# Patient Record
Sex: MALE | Race: BLACK OR AFRICAN AMERICAN | Employment: FULL TIME | URBAN - NONMETROPOLITAN AREA
[De-identification: names, ages, dates, MRNs, and addresses within clinical notes are randomized per-mention and may not be internally consistent; named-entity substitution may affect disease eponyms.]

---

## 2017-01-30 ENCOUNTER — OFFICE VISIT (OUTPATIENT)
Dept: CARDIOLOGY | Age: 42
End: 2017-01-30

## 2017-01-30 VITALS
SYSTOLIC BLOOD PRESSURE: 110 MMHG | BODY MASS INDEX: 30.62 KG/M2 | DIASTOLIC BLOOD PRESSURE: 80 MMHG | WEIGHT: 231 LBS | HEART RATE: 88 BPM | HEIGHT: 73 IN

## 2017-01-30 DIAGNOSIS — R07.82 INTERCOSTAL PAIN: ICD-10-CM

## 2017-01-30 DIAGNOSIS — R94.31 ABNORMAL EKG: Primary | ICD-10-CM

## 2017-01-30 PROCEDURE — 99203 OFFICE O/P NEW LOW 30 MIN: CPT | Performed by: INTERNAL MEDICINE

## 2017-01-30 RX ORDER — LISINOPRIL AND HYDROCHLOROTHIAZIDE 12.5; 1 MG/1; MG/1
TABLET ORAL
Refills: 1 | COMMUNITY
Start: 2017-01-18

## 2017-01-30 RX ORDER — PRAVASTATIN SODIUM 20 MG
TABLET ORAL
Refills: 0 | COMMUNITY
Start: 2017-01-24 | End: 2017-11-15 | Stop reason: ALTCHOICE

## 2017-02-01 ENCOUNTER — TELEPHONE (OUTPATIENT)
Dept: CARDIOLOGY | Age: 42
End: 2017-02-01

## 2017-02-01 DIAGNOSIS — R07.9 CHEST PAIN AT REST: ICD-10-CM

## 2017-02-01 DIAGNOSIS — R07.82 INTERCOSTAL PAIN: Primary | ICD-10-CM

## 2017-09-11 ENCOUNTER — OFFICE VISIT (OUTPATIENT)
Dept: SURGERY | Age: 42
End: 2017-09-11
Payer: MEDICAID

## 2017-09-11 VITALS
HEIGHT: 76 IN | BODY MASS INDEX: 27.16 KG/M2 | TEMPERATURE: 97.6 F | HEART RATE: 60 BPM | SYSTOLIC BLOOD PRESSURE: 120 MMHG | RESPIRATION RATE: 16 BRPM | OXYGEN SATURATION: 98 % | DIASTOLIC BLOOD PRESSURE: 76 MMHG | WEIGHT: 223 LBS

## 2017-09-11 DIAGNOSIS — K64.2 THIRD DEGREE HEMORRHOIDS: ICD-10-CM

## 2017-09-11 DIAGNOSIS — K62.5 BRIGHT RED RECTAL BLEEDING: Primary | ICD-10-CM

## 2017-09-11 PROCEDURE — 99202 OFFICE O/P NEW SF 15 MIN: CPT | Performed by: SURGERY

## 2017-09-12 ASSESSMENT — ENCOUNTER SYMPTOMS
TROUBLE SWALLOWING: 0
VOICE CHANGE: 0
SINUS PRESSURE: 0
RHINORRHEA: 0
STRIDOR: 0
ABDOMINAL PAIN: 0
ABDOMINAL DISTENTION: 0
RECTAL PAIN: 1
EYE DISCHARGE: 0
NAUSEA: 0
WHEEZING: 0
COLOR CHANGE: 0
EYE ITCHING: 0
FACIAL SWELLING: 0
EYE REDNESS: 0
CONSTIPATION: 0
ANAL BLEEDING: 1
EYE PAIN: 0
PHOTOPHOBIA: 0
COUGH: 0
VOMITING: 0
CHEST TIGHTNESS: 0
SHORTNESS OF BREATH: 0
BLOOD IN STOOL: 0
CHOKING: 0
DIARRHEA: 0
SORE THROAT: 0
BACK PAIN: 0
APNEA: 0

## 2017-09-20 ENCOUNTER — TELEPHONE (OUTPATIENT)
Dept: SURGERY | Age: 42
End: 2017-09-20

## 2017-09-20 ENCOUNTER — PROCEDURE VISIT (OUTPATIENT)
Dept: SURGERY | Age: 42
End: 2017-09-20
Payer: MEDICAID

## 2017-09-20 VITALS
SYSTOLIC BLOOD PRESSURE: 110 MMHG | HEIGHT: 76 IN | DIASTOLIC BLOOD PRESSURE: 52 MMHG | WEIGHT: 223 LBS | HEART RATE: 59 BPM | RESPIRATION RATE: 18 BRPM | BODY MASS INDEX: 27.16 KG/M2 | TEMPERATURE: 96.7 F | OXYGEN SATURATION: 97 %

## 2017-09-20 DIAGNOSIS — K64.2 THIRD DEGREE HEMORRHOIDS: Primary | ICD-10-CM

## 2017-09-20 DIAGNOSIS — K64.3 FOURTH DEGREE HEMORRHOIDS: ICD-10-CM

## 2017-09-20 DIAGNOSIS — I10 ESSENTIAL HYPERTENSION: ICD-10-CM

## 2017-09-20 DIAGNOSIS — Z01.818 PRE-OP TESTING: ICD-10-CM

## 2017-09-20 PROCEDURE — 99214 OFFICE O/P EST MOD 30 MIN: CPT | Performed by: SURGERY

## 2017-10-12 LAB
ANION GAP SERPL CALCULATED.3IONS-SCNC: 11 MMOL/L
BUN BLDV-MCNC: 11 MG/DL (ref 10–20)
CALCIUM SERPL-MCNC: 9.6 MG/DL (ref 8.7–10.8)
CHLORIDE BLD-SCNC: 100 MMOL/L (ref 95–111)
CO2: 31 MMOL/L (ref 21–32)
CREAT SERPL-MCNC: 1.2 MG/DL (ref 0.5–1.3)
EGFR AFRICAN AMERICAN: 80
EGFR IF NONAFRICAN AMERICAN: 66
GLUCOSE: 97 MG/DL (ref 70–100)
POTASSIUM SERPL-SCNC: 4.4 MMOL/L (ref 3.5–5.4)
SODIUM BLD-SCNC: 138 MMOL/L (ref 134–147)

## 2017-11-14 NOTE — H&P
Mari Tyson MD [facility]  General Surgery  New Patient Evaluation in Office  Pt Name: Tad Bertrand  Date of Birth 1975   Today's Date: 9/11/2017  Medical Record Number: [id number]  Referring Provider: No ref. provider found  Primary Care Provider: Phoenix Garcia CNP  No chief complaint on file. ASSESSMENT      Problem List Items Addressed This Visit     Third degree hemorrhoids    Relevant Orders    [place] HEMORRHOIDECTOMY INTERNAL RUBBER BAND LIGATIONS      Other Visit Diagnoses     Bright red rectal bleeding    -  Primary    Relevant Orders    ANOSCOPY DIAGNOSTIC Performed in the office today to evaluate the etiology of his rectal bleeding and to see if he is a candidate for potential rubberbanding         Past Medical History:   Diagnosis Date    Hyperlipidemia     Hypertension           PLANS      1. Schedule Tino for3 quadrant hemorrhoidectomy  2. We attempted rubber banding in the office but the day he showed up his hemorrhoids were grade 4 that day with massive swelling  3. Gen. Anesthesia  4. Outpatient  5. [id number]  6. No prep required. 7.   Orders Placed This Encounter:  No orders of the defined types were placed in this encounter. Carri Brennan is a 43 y.o. male seen in the office for evaluation of rectal bleeding. He describes symptoms as rectal blood brbp with BM. He also has the feeling that something pops out and he has to push it back in. He states his been going on for about 10 years. Onset of symptoms was gradual 10 years ago ago and has been intermittent since that time. He describes symptoms as bleeding which only occurs with bowel movements. He denies family hx of colorectal CA, melena and crohn's disease. Treatment to date has been OTC creams: not effective long term. Pain is controlled without any medications. Pt has not had a previous colonoscopy. 10 years, bleeding, last time yesterday with wiping, hangs out, has to push back in.  We had scheduled problem, drooling, ear discharge, ear pain, facial swelling, hearing loss, mouth sores, nosebleeds, postnasal drip, rhinorrhea, sinus pressure, sneezing, sore throat, tinnitus, trouble swallowing and voice change. Eyes: Negative for photophobia, pain, discharge, redness, itching and visual disturbance. Respiratory: Negative for apnea, cough, choking, chest tightness, shortness of breath, wheezing and stridor. Cardiovascular: Negative for chest pain, palpitations and leg swelling. Gastrointestinal: Positive for anal bleeding and rectal pain. Negative for abdominal distention, abdominal pain, blood in stool, constipation, diarrhea, nausea and vomiting. Genitourinary: Negative for decreased urine volume, difficulty urinating, discharge, dysuria, enuresis, flank pain, frequency, genital sores, hematuria, penile pain, penile swelling, scrotal swelling, testicular pain and urgency. Musculoskeletal: Negative for arthralgias, back pain, gait problem, joint swelling, myalgias, neck pain and neck stiffness. Skin: Negative for color change, pallor, rash and wound. Neurological: Negative for dizziness, tremors, seizures, syncope, facial asymmetry, speech difficulty, weakness, light-headedness, numbness and headaches. Hematological: Negative for adenopathy. Does not bruise/bleed easily. Psychiatric/Behavioral: Negative for agitation, behavioral problems, confusion, decreased concentration, dysphoric mood, hallucinations, self-injury, sleep disturbance and suicidal ideas. The patient is not nervous/anxious and is not hyperactive. OBJECTIVE    VITALS:  vitals were not taken for this visit. CONSTITUTIONAL: Alert and oriented times 3, no acute distress and cooperative to examination with proper mood and affect. SKIN: Skin color, texture, turgor normal. No rashes or lesions.   Physical Examination: Eyes - pupils equal and reactive, extraocular eye movements intact  Ears - bilateral TM's and external ear

## 2017-11-16 ENCOUNTER — ANESTHESIA EVENT (OUTPATIENT)
Dept: OPERATING ROOM | Age: 42
End: 2017-11-16
Payer: MEDICAID

## 2017-11-16 ENCOUNTER — HOSPITAL ENCOUNTER (OUTPATIENT)
Age: 42
Setting detail: OUTPATIENT SURGERY
Discharge: HOME | End: 2017-11-16
Attending: SURGERY | Admitting: SURGERY
Payer: MEDICAID

## 2017-11-16 ENCOUNTER — ANESTHESIA (OUTPATIENT)
Dept: OPERATING ROOM | Age: 42
End: 2017-11-16
Payer: MEDICAID

## 2017-11-16 VITALS
DIASTOLIC BLOOD PRESSURE: 79 MMHG | HEART RATE: 58 BPM | TEMPERATURE: 97 F | RESPIRATION RATE: 16 BRPM | SYSTOLIC BLOOD PRESSURE: 128 MMHG | OXYGEN SATURATION: 96 % | WEIGHT: 222.6 LBS | HEIGHT: 73 IN | BODY MASS INDEX: 29.5 KG/M2

## 2017-11-16 VITALS
OXYGEN SATURATION: 99 % | DIASTOLIC BLOOD PRESSURE: 44 MMHG | SYSTOLIC BLOOD PRESSURE: 89 MMHG | RESPIRATION RATE: 55 BRPM

## 2017-11-16 LAB — POTASSIUM SERPL-SCNC: 4.1 MEQ/L (ref 3.5–5.2)

## 2017-11-16 PROCEDURE — 3700000000 HC ANESTHESIA ATTENDED CARE: Performed by: SURGERY

## 2017-11-16 PROCEDURE — 3600000013 HC SURGERY LEVEL 3 ADDTL 15MIN: Performed by: SURGERY

## 2017-11-16 PROCEDURE — 84132 ASSAY OF SERUM POTASSIUM: CPT

## 2017-11-16 PROCEDURE — 2580000003 HC RX 258: Performed by: SURGERY

## 2017-11-16 PROCEDURE — 6360000002 HC RX W HCPCS: Performed by: SURGERY

## 2017-11-16 PROCEDURE — 7100000000 HC PACU RECOVERY - FIRST 15 MIN: Performed by: SURGERY

## 2017-11-16 PROCEDURE — A6402 STERILE GAUZE <= 16 SQ IN: HCPCS | Performed by: SURGERY

## 2017-11-16 PROCEDURE — 2720000010 HC SURG SUPPLY STERILE: Performed by: SURGERY

## 2017-11-16 PROCEDURE — 6360000002 HC RX W HCPCS: Performed by: NURSE ANESTHETIST, CERTIFIED REGISTERED

## 2017-11-16 PROCEDURE — 46260 REMOVE IN/EX HEM GROUPS 2+: CPT | Performed by: SURGERY

## 2017-11-16 PROCEDURE — 2500000003 HC RX 250 WO HCPCS: Performed by: SURGERY

## 2017-11-16 PROCEDURE — 7100000011 HC PHASE II RECOVERY - ADDTL 15 MIN: Performed by: SURGERY

## 2017-11-16 PROCEDURE — 2500000003 HC RX 250 WO HCPCS: Performed by: NURSE ANESTHETIST, CERTIFIED REGISTERED

## 2017-11-16 PROCEDURE — 88304 TISSUE EXAM BY PATHOLOGIST: CPT

## 2017-11-16 PROCEDURE — 7100000001 HC PACU RECOVERY - ADDTL 15 MIN: Performed by: SURGERY

## 2017-11-16 PROCEDURE — 2580000003 HC RX 258: Performed by: NURSE ANESTHETIST, CERTIFIED REGISTERED

## 2017-11-16 PROCEDURE — 36415 COLL VENOUS BLD VENIPUNCTURE: CPT

## 2017-11-16 PROCEDURE — 3600000003 HC SURGERY LEVEL 3 BASE: Performed by: SURGERY

## 2017-11-16 PROCEDURE — 3700000001 HC ADD 15 MINUTES (ANESTHESIA): Performed by: SURGERY

## 2017-11-16 PROCEDURE — 7100000010 HC PHASE II RECOVERY - FIRST 15 MIN: Performed by: SURGERY

## 2017-11-16 RX ORDER — FENTANYL CITRATE 50 UG/ML
50 INJECTION, SOLUTION INTRAMUSCULAR; INTRAVENOUS EVERY 5 MIN PRN
Status: DISCONTINUED | OUTPATIENT
Start: 2017-11-16 | End: 2017-11-16 | Stop reason: HOSPADM

## 2017-11-16 RX ORDER — FENTANYL CITRATE 50 UG/ML
INJECTION, SOLUTION INTRAMUSCULAR; INTRAVENOUS PRN
Status: DISCONTINUED | OUTPATIENT
Start: 2017-11-16 | End: 2017-11-16 | Stop reason: SDUPTHER

## 2017-11-16 RX ORDER — LIDOCAINE HYDROCHLORIDE 20 MG/ML
INJECTION, SOLUTION INFILTRATION; PERINEURAL PRN
Status: DISCONTINUED | OUTPATIENT
Start: 2017-11-16 | End: 2017-11-16 | Stop reason: SDUPTHER

## 2017-11-16 RX ORDER — KETOROLAC TROMETHAMINE 30 MG/ML
30 INJECTION, SOLUTION INTRAMUSCULAR; INTRAVENOUS EVERY 6 HOURS
Status: DISCONTINUED | OUTPATIENT
Start: 2017-11-16 | End: 2017-11-16 | Stop reason: HOSPADM

## 2017-11-16 RX ORDER — MIDAZOLAM HYDROCHLORIDE 1 MG/ML
INJECTION INTRAMUSCULAR; INTRAVENOUS PRN
Status: DISCONTINUED | OUTPATIENT
Start: 2017-11-16 | End: 2017-11-16 | Stop reason: SDUPTHER

## 2017-11-16 RX ORDER — ONDANSETRON 2 MG/ML
4 INJECTION INTRAMUSCULAR; INTRAVENOUS EVERY 6 HOURS PRN
Status: DISCONTINUED | OUTPATIENT
Start: 2017-11-16 | End: 2017-11-16 | Stop reason: HOSPADM

## 2017-11-16 RX ORDER — SODIUM CHLORIDE 9 MG/ML
INJECTION, SOLUTION INTRAVENOUS CONTINUOUS PRN
Status: DISCONTINUED | OUTPATIENT
Start: 2017-11-16 | End: 2017-11-16 | Stop reason: SDUPTHER

## 2017-11-16 RX ORDER — BUPIVACAINE HYDROCHLORIDE AND EPINEPHRINE 5; 5 MG/ML; UG/ML
INJECTION, SOLUTION EPIDURAL; INTRACAUDAL; PERINEURAL PRN
Status: DISCONTINUED | OUTPATIENT
Start: 2017-11-16 | End: 2017-11-16 | Stop reason: HOSPADM

## 2017-11-16 RX ORDER — SODIUM CHLORIDE, SODIUM LACTATE, POTASSIUM CHLORIDE, CALCIUM CHLORIDE 600; 310; 30; 20 MG/100ML; MG/100ML; MG/100ML; MG/100ML
INJECTION, SOLUTION INTRAVENOUS CONTINUOUS
Status: DISCONTINUED | OUTPATIENT
Start: 2017-11-16 | End: 2017-11-16 | Stop reason: HOSPADM

## 2017-11-16 RX ORDER — SODIUM CHLORIDE 0.9 % (FLUSH) 0.9 %
10 SYRINGE (ML) INJECTION EVERY 12 HOURS SCHEDULED
Status: DISCONTINUED | OUTPATIENT
Start: 2017-11-16 | End: 2017-11-16 | Stop reason: HOSPADM

## 2017-11-16 RX ORDER — OXYCODONE HYDROCHLORIDE AND ACETAMINOPHEN 5; 325 MG/1; MG/1
1 TABLET ORAL EVERY 4 HOURS PRN
Status: DISCONTINUED | OUTPATIENT
Start: 2017-11-16 | End: 2017-11-16 | Stop reason: HOSPADM

## 2017-11-16 RX ORDER — SODIUM CHLORIDE 0.9 % (FLUSH) 0.9 %
10 SYRINGE (ML) INJECTION PRN
Status: DISCONTINUED | OUTPATIENT
Start: 2017-11-16 | End: 2017-11-16 | Stop reason: HOSPADM

## 2017-11-16 RX ORDER — ACETAMINOPHEN 325 MG/1
650 TABLET ORAL EVERY 4 HOURS PRN
Status: DISCONTINUED | OUTPATIENT
Start: 2017-11-16 | End: 2017-11-16 | Stop reason: HOSPADM

## 2017-11-16 RX ORDER — OXYCODONE HYDROCHLORIDE AND ACETAMINOPHEN 5; 325 MG/1; MG/1
1 TABLET ORAL EVERY 4 HOURS PRN
Qty: 42 TABLET | Refills: 0 | Status: SHIPPED | OUTPATIENT
Start: 2017-11-16 | End: 2017-11-29 | Stop reason: ALTCHOICE

## 2017-11-16 RX ORDER — ONDANSETRON 2 MG/ML
INJECTION INTRAMUSCULAR; INTRAVENOUS PRN
Status: DISCONTINUED | OUTPATIENT
Start: 2017-11-16 | End: 2017-11-16 | Stop reason: SDUPTHER

## 2017-11-16 RX ORDER — MEPERIDINE HYDROCHLORIDE 25 MG/ML
25 INJECTION INTRAMUSCULAR; INTRAVENOUS; SUBCUTANEOUS
Status: DISCONTINUED | OUTPATIENT
Start: 2017-11-16 | End: 2017-11-16 | Stop reason: HOSPADM

## 2017-11-16 RX ORDER — LABETALOL HYDROCHLORIDE 5 MG/ML
5 INJECTION, SOLUTION INTRAVENOUS EVERY 10 MIN PRN
Status: DISCONTINUED | OUTPATIENT
Start: 2017-11-16 | End: 2017-11-16 | Stop reason: HOSPADM

## 2017-11-16 RX ORDER — PROPOFOL 10 MG/ML
INJECTION, EMULSION INTRAVENOUS PRN
Status: DISCONTINUED | OUTPATIENT
Start: 2017-11-16 | End: 2017-11-16 | Stop reason: SDUPTHER

## 2017-11-16 RX ORDER — FENTANYL CITRATE 50 UG/ML
25 INJECTION, SOLUTION INTRAMUSCULAR; INTRAVENOUS EVERY 5 MIN PRN
Status: DISCONTINUED | OUTPATIENT
Start: 2017-11-16 | End: 2017-11-16 | Stop reason: HOSPADM

## 2017-11-16 RX ORDER — PROMETHAZINE HYDROCHLORIDE 25 MG/ML
12.5 INJECTION, SOLUTION INTRAMUSCULAR; INTRAVENOUS
Status: DISCONTINUED | OUTPATIENT
Start: 2017-11-16 | End: 2017-11-16 | Stop reason: HOSPADM

## 2017-11-16 RX ORDER — MORPHINE SULFATE 2 MG/ML
4 INJECTION, SOLUTION INTRAMUSCULAR; INTRAVENOUS
Status: DISCONTINUED | OUTPATIENT
Start: 2017-11-16 | End: 2017-11-16 | Stop reason: HOSPADM

## 2017-11-16 RX ORDER — DEXAMETHASONE SODIUM PHOSPHATE 4 MG/ML
INJECTION, SOLUTION INTRA-ARTICULAR; INTRALESIONAL; INTRAMUSCULAR; INTRAVENOUS; SOFT TISSUE PRN
Status: DISCONTINUED | OUTPATIENT
Start: 2017-11-16 | End: 2017-11-16 | Stop reason: SDUPTHER

## 2017-11-16 RX ORDER — DIPHENHYDRAMINE HYDROCHLORIDE 50 MG/ML
12.5 INJECTION INTRAMUSCULAR; INTRAVENOUS
Status: DISCONTINUED | OUTPATIENT
Start: 2017-11-16 | End: 2017-11-16 | Stop reason: HOSPADM

## 2017-11-16 RX ORDER — OXYCODONE HYDROCHLORIDE AND ACETAMINOPHEN 5; 325 MG/1; MG/1
2 TABLET ORAL EVERY 4 HOURS PRN
Status: DISCONTINUED | OUTPATIENT
Start: 2017-11-16 | End: 2017-11-16 | Stop reason: HOSPADM

## 2017-11-16 RX ORDER — MORPHINE SULFATE 2 MG/ML
2 INJECTION, SOLUTION INTRAMUSCULAR; INTRAVENOUS
Status: DISCONTINUED | OUTPATIENT
Start: 2017-11-16 | End: 2017-11-16 | Stop reason: HOSPADM

## 2017-11-16 RX ADMIN — PROPOFOL 30 MG: 10 INJECTION, EMULSION INTRAVENOUS at 07:29

## 2017-11-16 RX ADMIN — MIDAZOLAM HYDROCHLORIDE 2 MG: 1 INJECTION, SOLUTION INTRAMUSCULAR; INTRAVENOUS at 07:11

## 2017-11-16 RX ADMIN — PROPOFOL 170 MG: 10 INJECTION, EMULSION INTRAVENOUS at 07:17

## 2017-11-16 RX ADMIN — ONDANSETRON 4 MG: 2 INJECTION INTRAMUSCULAR; INTRAVENOUS at 07:22

## 2017-11-16 RX ADMIN — FENTANYL CITRATE 50 MCG: 50 INJECTION INTRAMUSCULAR; INTRAVENOUS at 07:30

## 2017-11-16 RX ADMIN — FENTANYL CITRATE 100 MCG: 50 INJECTION INTRAMUSCULAR; INTRAVENOUS at 07:13

## 2017-11-16 RX ADMIN — DEXAMETHASONE SODIUM PHOSPHATE 8 MG: 4 INJECTION, SOLUTION INTRAMUSCULAR; INTRAVENOUS at 07:22

## 2017-11-16 RX ADMIN — SODIUM CHLORIDE, POTASSIUM CHLORIDE, SODIUM LACTATE AND CALCIUM CHLORIDE: 600; 310; 30; 20 INJECTION, SOLUTION INTRAVENOUS at 06:15

## 2017-11-16 RX ADMIN — LIDOCAINE HYDROCHLORIDE 50 MG: 20 INJECTION, SOLUTION INFILTRATION; PERINEURAL at 07:17

## 2017-11-16 RX ADMIN — SODIUM CHLORIDE: 9 INJECTION, SOLUTION INTRAVENOUS at 07:14

## 2017-11-16 RX ADMIN — WATER 2 G: 1 INJECTION INTRAMUSCULAR; INTRAVENOUS; SUBCUTANEOUS at 07:17

## 2017-11-16 ASSESSMENT — PULMONARY FUNCTION TESTS
PIF_VALUE: 19
PIF_VALUE: 17
PIF_VALUE: 10
PIF_VALUE: 10
PIF_VALUE: 15
PIF_VALUE: 16
PIF_VALUE: 12
PIF_VALUE: 1
PIF_VALUE: 10
PIF_VALUE: 10
PIF_VALUE: 12
PIF_VALUE: 13
PIF_VALUE: 9
PIF_VALUE: 1
PIF_VALUE: 0
PIF_VALUE: 9
PIF_VALUE: 10
PIF_VALUE: 12
PIF_VALUE: 10
PIF_VALUE: 10
PIF_VALUE: 12
PIF_VALUE: 12
PIF_VALUE: 10
PIF_VALUE: 10
PIF_VALUE: 16
PIF_VALUE: 1
PIF_VALUE: 9
PIF_VALUE: 13
PIF_VALUE: 13
PIF_VALUE: 0
PIF_VALUE: 12
PIF_VALUE: 0
PIF_VALUE: 10
PIF_VALUE: 14
PIF_VALUE: 13
PIF_VALUE: 14
PIF_VALUE: 12
PIF_VALUE: 10
PIF_VALUE: 0
PIF_VALUE: 11
PIF_VALUE: 1
PIF_VALUE: 0
PIF_VALUE: 12
PIF_VALUE: 11
PIF_VALUE: 12

## 2017-11-16 ASSESSMENT — PAIN SCALES - GENERAL
PAINLEVEL_OUTOF10: 0

## 2017-11-16 ASSESSMENT — PAIN - FUNCTIONAL ASSESSMENT: PAIN_FUNCTIONAL_ASSESSMENT: 0-10

## 2017-11-16 NOTE — ANESTHESIA PRE PROCEDURE
Intravenous Q10 Min PRN Leanna Barrientos MD        meperidine (DEMEROL) injection 25 mg  25 mg Intravenous Once PRN Leanna Barrientos MD           Allergies:  No Known Allergies    Problem List:    Patient Active Problem List   Diagnosis Code    Intercostal pain R07.82    Third degree hemorrhoids K64.2       Past Medical History:        Diagnosis Date    Hyperlipidemia     Hypertension        Past Surgical History:  History reviewed. No pertinent surgical history. Social History:    Social History   Substance Use Topics    Smoking status: Never Smoker    Smokeless tobacco: Never Used    Alcohol use No                                Counseling given: Not Answered      Vital Signs (Current):   Vitals:    11/15/17 1346 11/16/17 0548   BP:  135/81   Pulse:  59   Resp:  18   Temp:  97.2 °F (36.2 °C)   TempSrc:  Temporal   SpO2:  95%   Weight: 215 lb (97.5 kg) 222 lb 9.6 oz (101 kg)   Height: 6' 1\" (1.854 m) 6' 1\" (1.854 m)                                              BP Readings from Last 3 Encounters:   11/16/17 135/81   09/20/17 (!) 110/52   09/11/17 120/76       NPO Status: Time of last liquid consumption: 2330                        Time of last solid consumption: 2330                        Date of last liquid consumption: 11/15/17                        Date of last solid food consumption: 11/15/17    BMI:   Wt Readings from Last 3 Encounters:   11/16/17 222 lb 9.6 oz (101 kg)   09/20/17 223 lb (101.2 kg)   09/11/17 223 lb (101.2 kg)     Body mass index is 29.37 kg/m². CBC: No results found for: WBC, RBC, HGB, HCT, MCV, RDW, PLT    CMP:   Lab Results   Component Value Date     10/11/2017    K 4.1 11/16/2017     10/11/2017    CO2 31 10/11/2017    BUN 11 10/11/2017    CREATININE 1.2 10/11/2017    GLUCOSE 97 10/11/2017    CALCIUM 9.6 10/11/2017       POC Tests: No results for input(s): POCGLU, POCNA, POCK, POCCL, POCBUN, POCHEMO, POCHCT in the last 72 hours.     Coags: No results found for: PROTIME, INR, APTT    HCG (If Applicable): No results found for: PREGTESTUR, PREGSERUM, HCG, HCGQUANT     ABGs: No results found for: PHART, PO2ART, FPJ9OPE, GKX2OUN, BEART, D1QIGZBB     Type & Screen (If Applicable):  No results found for: LABABO, [address]    Anesthesia Evaluation  Patient summary reviewed and Nursing notes reviewed  Airway: Mallampati: II  TM distance: >3 FB   Neck ROM: full  Mouth opening: > = 3 FB Dental: normal exam         Pulmonary:Negative Pulmonary ROS                              Cardiovascular:    (+) hypertension: mild,     (-) past MI, CAD, dysrhythmias and  angina       Beta Blocker:  Not on Beta Blocker         Neuro/Psych:   Negative Neuro/Psych ROS              GI/Hepatic/Renal: Neg GI/Hepatic/Renal ROS            Endo/Other: Negative Endo/Other ROS                    Abdominal:           Vascular:                                        Anesthesia Plan      general     ASA 2       Induction: intravenous. MIPS: Postoperative opioids intended and Prophylactic antiemetics administered. Anesthetic plan and risks discussed with patient and spouse. Plan discussed with CRNA. Jere Bleacher Linell Meckel, MD   11/16/2017

## 2017-11-16 NOTE — H&P
[facility]  History and Physical Update    Pt Name: Juliane Boateng  MRN: [id number]  YOB: 1975  Date of evaluation: 11/16/2017    [x] I have examined the patient and reviewed the H&P/Consult and there are no changes to the patient or plans.     [] I have examined the patient and reviewed the H&P/Consult and have noted the following changes:        Suzanne Dupont  Electronically signed 11/16/2017 at 6:43 AM

## 2017-11-17 ENCOUNTER — TELEPHONE (OUTPATIENT)
Dept: SURGERY | Age: 42
End: 2017-11-17

## 2017-11-25 PROBLEM — Z87.19 S/P HEMORRHOIDECTOMY: Status: ACTIVE | Noted: 2017-11-25

## 2017-11-25 PROBLEM — Z98.890 S/P HEMORRHOIDECTOMY: Status: ACTIVE | Noted: 2017-11-25

## 2017-11-25 NOTE — PROGRESS NOTES
Sarah Beth Rios MD [facility]  General Surgery  Postprocedure Evaluation in Office  Pt Name: Kalpana Sanders  Date of Birth 1975   Today's Date: 11/29/2017  Medical Record Number: [id number]  Referring Provider: No ref. provider found  Primary Care Provider: Gregory Breaux CNP  Chief Complaint   Patient presents with    Post-Op Check     s/p Three-quadrant hemorrhoidectomy 11/16/17     ASSESSMENT      Problem List Items Addressed This Visit     S/P hemorrhoidectomy      Other Visit Diagnoses    None. PLAN       Pathology reviewed with the patient who understands. All questions were answered. Patient Instructions   OK to start lifting      Follow up: Return in about 4 weeks (around 12/27/2017). Orders Placed This Encounter:  No orders of the defined types were placed in this encounter. Angela Wei is seen today for post-op follow-up. He is 2 week(s) status post 3 quadrant hemorrhoidectomy. He is tolerating a regular diet, having regular bowel movements. Symptoms and activity have gradually improved compared to preoperative. The surgical site is clean and has no drainage. The patient is not having any pain. Chales Minus He has compliant with postoperative instructions. Past Medical History  Past Medical History:   Diagnosis Date    Hyperlipidemia     Hypertension      Past Surgical History  Past Surgical History:   Procedure Laterality Date    MO HEMORRHOIDECTOMY,INT/EXT,1 COLUMN/GROUP N/A 11/16/2017    3 QUANDRANT HEMORRHOIDECTOMY performed by Sarah Beth Rios MD at [facility]     Medications  Current Outpatient Prescriptions on File Prior to Visit   Medication Sig Dispense Refill    lisinopril-hydrochlorothiazide (PRINZIDE;ZESTORETIC) 10-12.5 MG per tablet take 1 tablet by mouth once daily  1    hydrocortisone (ANUSOL-HC) 2.5 % rectal cream Place rectally 2 times daily Place rectally 2 times daily. No current facility-administered medications on file prior to visit.       Allergies  No

## 2017-11-29 ENCOUNTER — OFFICE VISIT (OUTPATIENT)
Dept: SURGERY | Age: 42
End: 2017-11-29

## 2017-11-29 VITALS
RESPIRATION RATE: 18 BRPM | HEART RATE: 68 BPM | WEIGHT: 219 LBS | TEMPERATURE: 97.3 F | SYSTOLIC BLOOD PRESSURE: 132 MMHG | DIASTOLIC BLOOD PRESSURE: 88 MMHG | BODY MASS INDEX: 28.89 KG/M2

## 2017-11-29 DIAGNOSIS — Z98.890 S/P HEMORRHOIDECTOMY: ICD-10-CM

## 2017-11-29 DIAGNOSIS — Z87.19 S/P HEMORRHOIDECTOMY: ICD-10-CM

## 2017-11-29 PROCEDURE — 99024 POSTOP FOLLOW-UP VISIT: CPT | Performed by: SURGERY

## 2019-01-01 ENCOUNTER — HOSPITAL ENCOUNTER (EMERGENCY)
Age: 44
Discharge: HOME | End: 2019-01-01
Payer: MEDICAID

## 2019-01-01 VITALS
SYSTOLIC BLOOD PRESSURE: 139 MMHG | WEIGHT: 222 LBS | OXYGEN SATURATION: 98 % | HEART RATE: 90 BPM | DIASTOLIC BLOOD PRESSURE: 73 MMHG | RESPIRATION RATE: 16 BRPM | BODY MASS INDEX: 28.49 KG/M2 | HEIGHT: 74 IN | TEMPERATURE: 97.8 F

## 2019-01-01 DIAGNOSIS — M54.31 SCIATICA OF RIGHT SIDE: Primary | ICD-10-CM

## 2019-01-01 PROCEDURE — 99213 OFFICE O/P EST LOW 20 MIN: CPT | Performed by: NURSE PRACTITIONER

## 2019-01-01 PROCEDURE — 99213 OFFICE O/P EST LOW 20 MIN: CPT

## 2019-01-01 RX ORDER — CYCLOBENZAPRINE HCL 10 MG
10 TABLET ORAL 3 TIMES DAILY PRN
Qty: 20 TABLET | Refills: 0 | Status: SHIPPED | OUTPATIENT
Start: 2019-01-01 | End: 2019-01-08

## 2019-01-01 RX ORDER — PREDNISONE 20 MG/1
40 TABLET ORAL DAILY
Qty: 10 TABLET | Refills: 0 | Status: SHIPPED | OUTPATIENT
Start: 2019-01-01 | End: 2019-01-06

## 2019-01-01 ASSESSMENT — ENCOUNTER SYMPTOMS
VOMITING: 0
NAUSEA: 0

## 2019-01-01 ASSESSMENT — PAIN DESCRIPTION - LOCATION: LOCATION: BACK

## 2019-01-01 ASSESSMENT — PAIN DESCRIPTION - PAIN TYPE: TYPE: ACUTE PAIN

## 2019-01-01 ASSESSMENT — PAIN SCALES - GENERAL: PAINLEVEL_OUTOF10: 7

## 2019-01-01 ASSESSMENT — PAIN DESCRIPTION - ORIENTATION: ORIENTATION: LOWER;MID

## 2020-11-26 ENCOUNTER — HOSPITAL ENCOUNTER (EMERGENCY)
Age: 45
Discharge: HOME | End: 2020-11-26
Payer: MEDICAID

## 2020-11-26 VITALS
RESPIRATION RATE: 16 BRPM | BODY MASS INDEX: 30.07 KG/M2 | TEMPERATURE: 98.2 F | DIASTOLIC BLOOD PRESSURE: 109 MMHG | WEIGHT: 222 LBS | HEIGHT: 72 IN | SYSTOLIC BLOOD PRESSURE: 173 MMHG | HEART RATE: 72 BPM | OXYGEN SATURATION: 99 %

## 2020-11-26 PROCEDURE — 99282 EMERGENCY DEPT VISIT SF MDM: CPT

## 2020-11-26 RX ORDER — TRIAMCINOLONE ACETONIDE 5 MG/G
OINTMENT TOPICAL
Qty: 1 TUBE | Refills: 0 | Status: SHIPPED | OUTPATIENT
Start: 2020-11-26 | End: 2020-12-03

## 2020-11-26 RX ORDER — CEPHALEXIN 500 MG/1
500 CAPSULE ORAL 4 TIMES DAILY
Qty: 28 CAPSULE | Refills: 0 | Status: SHIPPED | OUTPATIENT
Start: 2020-11-26 | End: 2020-12-03

## 2020-11-26 ASSESSMENT — ENCOUNTER SYMPTOMS
NAUSEA: 0
RHINORRHEA: 0
ABDOMINAL PAIN: 0
COUGH: 0
BACK PAIN: 0
VOMITING: 0
WHEEZING: 0
SHORTNESS OF BREATH: 0

## 2020-11-26 ASSESSMENT — PAIN DESCRIPTION - PAIN TYPE: TYPE: ACUTE PAIN

## 2020-11-26 ASSESSMENT — PAIN DESCRIPTION - ORIENTATION: ORIENTATION: LEFT

## 2020-11-26 ASSESSMENT — PAIN DESCRIPTION - LOCATION: LOCATION: FINGER (COMMENT WHICH ONE)

## 2020-11-26 NOTE — ED NOTES
Bed: 021A  Expected date:   Expected time:   Means of arrival:   Comments:  101 Kojo Lopez RN  11/26/20 9743

## 2020-11-26 NOTE — ED NOTES
Pt arrives to ED with left hand pinky pain and swelling. Pt states it began 3 days ago. Pt states he \"stabbed it to attempt to open it\" pt denies any injury. Pt also states he has not been taking his blood pressure medication. Pt shows no signs of distress.       Rhonda REINA RN  11/26/20 5880

## 2020-11-26 NOTE — ED PROVIDER NOTES
[facility] EMERGENCY DEPT      CHIEF COMPLAINT       Chief Complaint   Patient presents with    Finger Pain       Nurses Notes reviewed and I agree except as noted in the HPI. HISTORY OF PRESENT ILLNESS    Cassie Coffman is a 39 y.o. male who presents for left 5th finger pain x 2-3 days which has progressive become more painful and swollen. He reports soaking it in warm water with no help. He has not taken anything for the pain. He states he has hypertension and he has not taken his BP meds \"in a while\". He has a history of MRSA in the axilla a couple years ago and a history of eczema which he states is not under well control. He denies fevers, chills, headache, chest pain, shortness of breath. The patient is right-hand dominant. Patient denies injury. REVIEW OF SYSTEMS     Review of Systems   Constitutional: Negative for chills and fever. HENT: Negative for rhinorrhea. Eyes: Negative for visual disturbance. Respiratory: Negative for cough, shortness of breath and wheezing. Cardiovascular: Negative for chest pain. Gastrointestinal: Negative for abdominal pain, nausea and vomiting. Musculoskeletal: Positive for arthralgias and myalgias. Negative for back pain and neck pain. Skin: Negative for rash (eczema on extensor chi) and wound. Neurological: Negative for weakness and numbness. Psychiatric/Behavioral: Negative for confusion. PAST MEDICAL HISTORY    has a past medical history of Hyperlipidemia and Hypertension. SURGICAL HISTORY      has a past surgical history that includes pr hemorrhoidectomy,int/ext,1 column/group (N/A, 11/16/2017). CURRENT MEDICATIONS       Discharge Medication List as of 11/26/2020  3:52 AM      CONTINUE these medications which have NOT CHANGED    Details   hydrocortisone (ANUSOL-HC) 2.5 % rectal cream Place rectally 2 times daily Place rectally 2 times daily. , Rectal, 2 TIMES DAILY, Until Discontinued, Historical Med lisinopril-hydrochlorothiazide (PRINZIDE;ZESTORETIC) 10-12.5 MG per tablet take 1 tablet by mouth once daily, R-1             ALLERGIES     has No Known Allergies. FAMILY HISTORY     He indicated that his mother is . He indicated that his father is . family history includes Heart Disease in his mother. SOCIAL HISTORY    reports that he has never smoked. He has never used smokeless tobacco. He reports that he does not drink alcohol or use drugs. PHYSICAL EXAM     INITIAL VITALS:  height is 6' (1.829 m) and weight is 222 lb (100.7 kg). His oral temperature is 98.2 °F (36.8 °C). His blood pressure is 173/109 (abnormal) and his pulse is 72. His respiration is 16 and oxygen saturation is 99%. Physical Exam  Vitals signs and nursing note reviewed. Constitutional:       General: He is not in acute distress. Appearance: Normal appearance. He is well-developed. He is not toxic-appearing or diaphoretic. HENT:      Head: Normocephalic and atraumatic. Right Ear: Hearing normal.      Left Ear: Hearing normal.      Nose: Nose normal.   Eyes:      General: Lids are normal.      Conjunctiva/sclera: Conjunctivae normal.   Neck:      Musculoskeletal: No neck rigidity. Trachea: No tracheal deviation. Cardiovascular:      Rate and Rhythm: Normal rate and regular rhythm. Pulses:           Radial pulses are 2+ on the right side and 2+ on the left side. Pulmonary:      Effort: Pulmonary effort is normal. No tachypnea or respiratory distress. Breath sounds: No stridor. Abdominal:      General: There is no distension. Palpations: Abdomen is soft. Musculoskeletal: Normal range of motion. General: Swelling (to 5th left digit over the PIP joint with edema, wamrth, and erythema) and tenderness present. Comments: ROM of PIP and DIP limited prior to I & D. Skin:     General: Skin is warm and dry.       Capillary Refill: Capillary refill takes less than 2 seconds. Coloration: Skin is not pale. Findings: No abrasion, ecchymosis or rash. Neurological:      Mental Status: He is alert and oriented to person, place, and time. GCS: GCS eye subscore is 4. GCS verbal subscore is 5. GCS motor subscore is 6. Sensory: No sensory deficit. Gait: Gait normal.   Psychiatric:         Speech: Speech normal.         Behavior: Behavior normal.         Thought Content: Thought content normal.         DIFFERENTIAL DIAGNOSIS:   Including but not limited to: hematoma, abscess, cellulitis, injury, unlikely septic joint or tenosynovitis     DIAGNOSTIC RESULTS     EKG: All E[facility]'s are [facility] who either signs or Co-signs this chart in the absence of a cardiologist.  None    RADIOLOGY: non-plain film images(s) such as CT,Ultrasound and MRI are read by the radiologist.  Plain radiographic images are visualized and preliminarily interpreted by the emergency physician unless otherwise stated below. No orders to display       LABS:   Labs Reviewed - No data to display    EMERGENCY DEPARTMENT COURSE:   Vitals:    Vitals:    11/26/20 0227 11/26/20 0344   BP: (!) 167/124 (!) 173/109   Pulse: 72    Resp: 16    Temp: 98.2 °F (36.8 °C)    TempSrc: Oral    SpO2: 99%    Weight: 222 lb (100.7 kg)    Height: 6' (1.829 m)        MDM:  Mr. John Hills presented to the ED with left 5th digit pain and swelling x 2-3 days. Vitals assessed and showed hypertension, patient hasn't taken at home anti-hypertensive medication \"in awhile\". On PE, there is a fluctuant swelling over the PIP joint of the left 5th digit. No fever or streaking redness. An incision and drainage of the fluctuant area was performed and blood drained from what appeared to be a hematoma. After procedure, his ROM of PIP and DIP joints was much improved. A splint was applied and he is stable for discharge. He was educated to follow up if his symptoms worsen or do not improve. Education in regards to medication compliance and adverse effects of untreated hypertension provided. The patient remained neurovascularly intact. I have given the patient strict written and verbal instructions about care at home, follow-up, and signs and symptoms of worsening of condition and they did verbalize understanding. CRITICAL CARE:   None    CONSULTS:  None    PROCEDURES:  Incision/Drainage    Date/Time: 11/26/2020 3:15 AM  Performed by: Edison Cardenas PA-C  Authorized by: Edison Cardenas PA-C     Consent:     Consent obtained:  Verbal    Consent given by:  Patient    Risks discussed:  Bleeding, incomplete drainage, pain and infection    Alternatives discussed:  No treatment and alternative treatment  Location:     Type:  Hematoma    Size:  4 cm x 2 cm x 1 cm    Location:  Upper extremity    Upper extremity location:  Finger    Finger location:  L small finger  Pre-procedure details:     Skin preparation:  Betadine  Anesthesia (see MAR for exact dosages): Anesthesia method:  Local infiltration    Local anesthetic:  Lidocaine 1% w/o epi  Procedure type:     Complexity:  Simple  Procedure details:     Needle aspiration: no      Incision types:  Single straight    Incision depth:  Subcutaneous    Scalpel blade:  11    Wound management:  Probed and deloculated    Drainage:  Bloody    Drainage amount: Moderate    Wound treatment:  Wound left open    Packing materials:  None  Post-procedure details:     Patient tolerance of procedure: Tolerated well, no immediate complications  Comments:      Incision and drainage performed by Saige CARPENTER under my direct supervision. FINAL IMPRESSION      1. Hemarthrosis, nontraumatic left 5th digit PIP joint    2. Eczema, unspecified type    3. Uncontrolled hypertension          DISPOSITION/PLAN     1. Hemarthrosis, nontraumatic left 5th digit PIP joint    2. Eczema, unspecified type    3.  Uncontrolled hypertension        PATIENT REFERRED TO:  Handy Meredith DONNA GarciaN - CNP  [address] [place] [address]    Schedule an appointment as soon as possible for a visit         DISCHARGE MEDICATIONS:  Discharge Medication List as of 11/26/2020  3:52 AM      START taking these medications    Details   cephALEXin (KEFLEX) 500 MG capsule Take 1 capsule by mouth 4 times daily for 7 days, Disp-28 capsule,R-0Print      triamcinolone (ARISTOCORT) 0.5 % ointment Apply topically 2 times daily. , Disp-1 Tube,R-0, Print             (Please note that portions of this note were completed with a voice recognition program.  Efforts were made to edit the dictations but occasionally words are mis-transcribed.)    William Newell PA-C 11/30/20 7:25 AM    BHARATI Lugo PA-C  11/30/20 [id number]

## 2021-03-23 ENCOUNTER — APPOINTMENT (OUTPATIENT)
Dept: GENERAL RADIOLOGY | Age: 46
End: 2021-03-23
Payer: MEDICAID

## 2021-03-23 ENCOUNTER — HOSPITAL ENCOUNTER (EMERGENCY)
Age: 46
Discharge: HOME | End: 2021-03-23
Payer: MEDICAID

## 2021-03-23 VITALS
SYSTOLIC BLOOD PRESSURE: 140 MMHG | WEIGHT: 215 LBS | RESPIRATION RATE: 18 BRPM | HEIGHT: 73 IN | TEMPERATURE: 97.8 F | BODY MASS INDEX: 28.49 KG/M2 | DIASTOLIC BLOOD PRESSURE: 101 MMHG | HEART RATE: 65 BPM | OXYGEN SATURATION: 98 %

## 2021-03-23 DIAGNOSIS — M79.672 PAIN OF LEFT HEEL: Primary | ICD-10-CM

## 2021-03-23 PROCEDURE — 73630 X-RAY EXAM OF FOOT: CPT

## 2021-03-23 PROCEDURE — 6370000000 HC RX 637 (ALT 250 FOR IP): Performed by: NURSE PRACTITIONER

## 2021-03-23 PROCEDURE — 99285 EMERGENCY DEPT VISIT HI MDM: CPT | Performed by: NURSE PRACTITIONER

## 2021-03-23 RX ORDER — CYCLOBENZAPRINE HCL 10 MG
10 TABLET ORAL ONCE
Status: COMPLETED | OUTPATIENT
Start: 2021-03-23 | End: 2021-03-23

## 2021-03-23 RX ORDER — TRAMADOL HYDROCHLORIDE 50 MG/1
50 TABLET ORAL ONCE
Status: COMPLETED | OUTPATIENT
Start: 2021-03-23 | End: 2021-03-23

## 2021-03-23 RX ORDER — KETOROLAC TROMETHAMINE 10 MG/1
10 TABLET, FILM COATED ORAL EVERY 6 HOURS PRN
Qty: 20 TABLET | Refills: 0 | Status: SHIPPED | OUTPATIENT
Start: 2021-03-23

## 2021-03-23 RX ADMIN — CYCLOBENZAPRINE HYDROCHLORIDE 10 MG: 10 TABLET, FILM COATED ORAL at 10:07

## 2021-03-23 RX ADMIN — TRAMADOL HYDROCHLORIDE 50 MG: 50 TABLET, FILM COATED ORAL at 10:07

## 2021-03-23 ASSESSMENT — ENCOUNTER SYMPTOMS
COUGH: 0
SHORTNESS OF BREATH: 0

## 2021-03-23 ASSESSMENT — PAIN DESCRIPTION - LOCATION: LOCATION: FOOT

## 2021-03-23 ASSESSMENT — PAIN SCALES - GENERAL: PAINLEVEL_OUTOF10: 4

## 2021-03-23 ASSESSMENT — PAIN DESCRIPTION - ORIENTATION: ORIENTATION: LEFT

## 2021-03-23 NOTE — ED NOTES
Patient transported to Radiology department via [employer] tech in stable condition.        Ana Smith RN  03/23/21 9144

## 2021-03-23 NOTE — ED TRIAGE NOTES
Pt presents to ER with left ankle/foot pain that started yesterday. Pain was severe yesterday but has improved today. Pain is rated 4 out of 10. No obvious deformity or swelling noted.

## 2021-03-23 NOTE — ED PROVIDER NOTES
[facility] COMPLAINT       Chief Complaint   Patient presents with    Foot Pain       Nurses Notes reviewed and I agree except as noted in the HPI. HISTORY OF PRESENT ILLNESS    Bryson Rendon is a 39 y.o. male who presents to the Emergency Department for the evaluation of left foot pain. Patient states he noticed left heel discomfort yesterday, but he woke up today with pain 10/10 severity and was unable to bear weight. Describes the pain as a \"stiffness\". No radiation. Reports trying ice and heat with no relief, but has not tried pain medication. Denies fall or trauma or previous bone or connective tissue injury. Patient has never experienced anything like this before. The HPI was provided by the patient. REVIEW OF SYSTEMS     Review of Systems   Constitutional: Negative for chills, diaphoresis, fatigue and fever. Respiratory: Negative for cough and shortness of breath. Cardiovascular: Negative for chest pain and leg swelling. Musculoskeletal: Positive for arthralgias and gait problem. Neurological: Negative for tremors, weakness and numbness. Psychiatric/Behavioral: Negative for agitation, confusion and self-injury. The patient is not nervous/anxious. PAST MEDICAL HISTORY    has a past medical history of Hyperlipidemia and Hypertension. SURGICAL HISTORY      has a past surgical history that includes pr hemorrhoidectomy,int/ext,1 column/group (N/A, 11/16/2017). CURRENT MEDICATIONS       Discharge Medication List as of 3/23/2021 10:55 AM      CONTINUE these medications which have NOT CHANGED    Details   hydrocortisone (ANUSOL-HC) 2.5 % rectal cream Place rectally 2 times daily Place rectally 2 times daily. , Rectal, 2 TIMES DAILY, Until Discontinued, Historical Med      lisinopril-hydrochlorothiazide (PRINZIDE;ZESTORETIC) 10-12.5 MG per tablet take 1 tablet by mouth once daily, R-1             ALLERGIES     has fracture    DIAGNOSTIC RESULTS     EKG: All EKG's are interpreted by the Emergency Department Physician who either signs or Co-signs this chart in the absence of a cardiologist.    None    RADIOLOGY: non-plainfilm images(s) such as CT, Ultrasound and MRI are read by the radiologist.    XR FOOT LEFT (MIN 3 VIEWS)   Final Result   No acute abnormality            **This report has been created using voice recognition software. It may contain minor errors which are inherent in voice recognition technology. **      Final report electronically signed by Dr. Jessi Quinn on 3/23/2021 9:52 AM          LABS:     Labs Reviewed - No data to display    EMERGENCY DEPARTMENT COURSE:   Vitals:    Vitals:    03/23/21 0859   BP: (!) 140/101   Pulse: 65   Resp: 18   Temp: 97.8 °F (36.6 °C)   TempSrc: Oral   SpO2: 98%   Weight: 215 lb (97.5 kg)   Height: 6' 1\" (1.854 m)       8:56 AM EDT: The patient was seen and evaluated. MDM:  Plain films completed, no acute osseous abnormality, patient instructed to follow with podiatry. Provided with appropriate analgesia as well as work note. No other questions or concerns were voiced and he was discharged in stable condition. CRITICAL CARE:   None    CONSULTS:  None    PROCEDURES:  None    FINAL IMPRESSION      1. Pain of left heel          DISPOSITION/PLAN   Discharge    PATIENT REFERRED TO:  Shadi Goff  [address] [place] [address]  [phone]    Schedule an appointment as soon as possible for a visit         DISCHARGE MEDICATIONS:  Discharge Medication List as of 3/23/2021 10:55 AM      START taking these medications    Details   ketorolac (TORADOL) 10 MG tablet Take 1 tablet by mouth every 6 hours as needed for Pain, Disp-20 tablet, R-0Print             (Please note that portions of this note were completed with a voice recognition program.  Efforts were made to edit the dictations but occasionally words are mis-transcribed.)    The patient was given an opportunity to see the Emergency Attending. The patient voiced understanding that I was a Mid-LevelProvider and was in agreement with being seen independently by myself. Provider:  I personally performed the services described in the documentation, reviewed and edited the documentation which was dictated to the scribe in my presence, and it accurately records my words and actions.     KASSI Muse CNP, 3/23/21, 3:49 PM       KASSI Muse CNP  03/24/21 154

## 2021-12-26 ENCOUNTER — HOSPITAL ENCOUNTER (EMERGENCY)
Age: 46
Discharge: HOME | End: 2021-12-26
Attending: STUDENT IN AN ORGANIZED HEALTH CARE EDUCATION/TRAINING PROGRAM
Payer: COMMERCIAL

## 2021-12-26 VITALS
RESPIRATION RATE: 16 BRPM | DIASTOLIC BLOOD PRESSURE: 107 MMHG | HEART RATE: 88 BPM | TEMPERATURE: 98.8 F | SYSTOLIC BLOOD PRESSURE: 144 MMHG | OXYGEN SATURATION: 97 %

## 2021-12-26 DIAGNOSIS — J11.1 INFLUENZA WITH RESPIRATORY MANIFESTATION OTHER THAN PNEUMONIA: Primary | ICD-10-CM

## 2021-12-26 LAB
FLU A ANTIGEN: POSITIVE
FLU B ANTIGEN: NEGATIVE
SARS-COV-2, NAAT: NOT DETECTED

## 2021-12-26 PROCEDURE — 87635 SARS-COV-2 COVID-19 AMP PRB: CPT

## 2021-12-26 PROCEDURE — 87804 INFLUENZA ASSAY W/OPTIC: CPT

## 2021-12-26 PROCEDURE — 99281 EMR DPT VST MAYX REQ PHY/QHP: CPT

## 2021-12-26 NOTE — ED PROVIDER NOTES
[address] MEDICINE ATTENDING ATTESTATION      Evaluation of Adry Winchester. Case discussed and care plan developed with resident physician. I agree with the resident physician documentation and plan as documented by her, except if my documentation differs. Patient seen, interviewed and examined by me. I reviewed the medical, surgical, family and social history, medications and allergies. I have reviewed the nursing documentation. I have reviewed the patient's vital signs and are normal per my interpretation. There is no height or weight on file to calculate BMI. Pulsoxymetry is normal per my interpretation. Brief H&P   Patient c/o nasal congestion/rhinorrhea x3 days, requesting COVID test    Physical exam is notable for well appearing, lungs clear bilaterally, nasal congestion       Medical Decision Making   MDM:   Patient is a 60-year-old male with no significant past medical history who presents with 3 days of congestion and rhinorrhea. Patient is positive for influenza A. Patient is outside Tamiflu treatment window and is overall well-appearing with minimal symptoms. Plan:    Supportive care   Discharge   Return precautions    Please see the resident physician completed note for final disposition except as documented on this attestation. I have reviewed and interpreted all available lab, radiology and ekg results available at the moment. Diagnosis, treatment and disposition plans were discussed and agreed upon by patient. This transcription was electronically signed. It was dictated by use of voice recognition software and electronically transcribed. The transcription may contain errors not detected in proofreading.      I performed direct supervision and was present for the critical portion following procedures: None  Critical care time on this case: None    Electronically signed by Den Street MD on 12/26/21 at 10:58 AM WOOD Rivera Inés Quezada MD  12/26/21 2017

## 2021-12-26 NOTE — ED TRIAGE NOTES
Patient presents to ED with c/o runny nose x3 days. States that he believes it is just a cold but wanted to get tested for covid just in case. Denies any pain or shortness of breath. Call light in reach.

## 2021-12-27 ASSESSMENT — ENCOUNTER SYMPTOMS
RHINORRHEA: 0
WHEEZING: 0
ABDOMINAL DISTENTION: 0
VOMITING: 0
ABDOMINAL PAIN: 0
NAUSEA: 0
SHORTNESS OF BREATH: 0
EYE DISCHARGE: 0
SORE THROAT: 0
DIARRHEA: 0
EYE PAIN: 0
COUGH: 1

## 2021-12-27 NOTE — ED PROVIDER NOTES
[facility] ENCOUNTER          Pt Name: Concepcion Campbell  MRN: [id number]  [place] 1975  Date of evaluation: 12/26/2021  Treating Resident Physician: Wolfgang Jiang MD  Supervising Physician: Yeyo Murillo MD    CHIEF COMPLAINT       Chief Complaint   Patient presents with    Nasal Congestion     History obtained from the patient. HISTORY OF PRESENT ILLNESS    HPI  Concepcion Campbell is a 55 y.o. male who presents to the emergency department for evaluation of nasal congestion x3 days. Patient states that he has had increased nasal congestion and occasional cough for the last 3 days. This is exacerbated by exertion alleviated by nothing. Patient states he has had sick contacts in his home as well. The patient has no other acute complaints at this time. REVIEW OF SYSTEMS   Review of Systems   Constitutional: Negative for fatigue and fever. HENT: Positive for congestion. Negative for ear pain, rhinorrhea and sore throat. Eyes: Negative for pain and discharge. Respiratory: Positive for cough. Negative for shortness of breath and wheezing. Cardiovascular: Negative for chest pain, palpitations and leg swelling. Gastrointestinal: Negative for abdominal distention, abdominal pain, diarrhea, nausea and vomiting. Genitourinary: Negative for difficulty urinating, flank pain and frequency. Musculoskeletal: Negative for arthralgias. Neurological: Negative for dizziness, tremors, syncope, weakness and numbness. PAST MEDICAL AND SURGICAL HISTORY     Past Medical History:   Diagnosis Date    Hyperlipidemia     Hypertension      Past Surgical History:   Procedure Laterality Date    MI HEMORRHOIDECTOMY,INT/EXT,1 COLUMN/GROUP N/A 11/16/2017    3 QUANDRANT HEMORRHOIDECTOMY performed by Andrew Pittman MD at [address]   No current facility-administered medications for this encounter.     Current Outpatient Medications:     ketorolac (TORADOL) 10 MG tablet, Take 1 tablet by mouth every 6 hours as needed for Pain, Disp: 20 tablet, Rfl: 0    hydrocortisone (ANUSOL-HC) 2.5 % rectal cream, Place rectally 2 times daily Place rectally 2 times daily. , Disp: , Rfl:     lisinopril-hydrochlorothiazide (PRINZIDE;ZESTORETIC) 10-12.5 MG per tablet, take 1 tablet by mouth once daily, Disp: , Rfl: 1      SOCIAL HISTORY     Social History     Social History Narrative    Not on file     Social History     Tobacco Use    Smoking status: Never Smoker    Smokeless tobacco: Never Used   Substance Use Topics    Alcohol use: No    Drug use: No         ALLERGIES   No Known Allergies      FAMILY HISTORY     Family History   Problem Relation Age of Onset    Heart Disease Mother          PREVIOUS RECORDS   Previous records reviewed today    PHYSICAL EXAM     ED Triage Vitals [12/26/21 1023]   BP Temp Temp Source Pulse Resp SpO2 Height Weight   (!) 144/107 98.8 °F (37.1 °C) Oral 88 16 97 % -- --     Initial vital signs and nursing assessment reviewed and abnormal from hypertension. There is no height or weight on file to calculate BMI. Pulsoximetry is normal per my interpretation. Additional Vital Signs:  Vitals:    12/26/21 1023   BP: (!) 144/107   Pulse: 88   Resp: 16   Temp: 98.8 °F (37.1 °C)   SpO2: 97%       Physical Exam  Constitutional:       Appearance: Normal appearance. HENT:      Head: Normocephalic. Right Ear: External ear normal.      Left Ear: External ear normal.      Nose: Congestion present. Mouth/Throat:      Mouth: Mucous membranes are moist.      Pharynx: Oropharynx is clear. Eyes:      Conjunctiva/sclera: Conjunctivae normal.      Pupils: Pupils are equal, round, and reactive to light. Cardiovascular:      Rate and Rhythm: Normal rate and regular rhythm. Pulses: Normal pulses. Heart sounds: Normal heart sounds.    Pulmonary:      Effort: Pulmonary effort is normal.      Breath sounds: have been transcribed with the assistance of an ED scribe. The transcription may contain errors not detected in proofreading. Please refer to my supervising physician's documentation if my documentation differs.     Electronically Signed: Alejandro Asif MD, 12/27/21, 6:26 PM              Alejandro Asif MD  Resident  12/30/21 [id number]

## 2022-03-30 ENCOUNTER — HOSPITAL ENCOUNTER (OUTPATIENT)
Age: 47
Setting detail: SPECIMEN
Discharge: HOME | End: 2022-03-30

## 2022-04-03 LAB
CULTURE: NORMAL
DIRECT EXAM: NORMAL
DIRECT EXAM: NORMAL
SPECIMEN DESCRIPTION: NORMAL

## 2022-11-15 NOTE — PROGRESS NOTES
[facility] ENC[facility]  PODIATRY ESTABLISHED OFFICE VISIT    CHIEF COMPLAINT:   Chief Complaint   Patient presents with   • Office Visit     bilateral foot ingrown toe nail, not diabetic  Have been sore for about a month  Has tried soaking but just recently stopped due to running out of empson     SUBJECTIVE:  HISTORY OF PRESENT ILLNESS:  Bib Lamb is a 20 year old male seen today for evaluation of ingrown toenail of the bilateral great toes. He states both great toenails have been sore for 1 month and has been soaking in epsom salt water up until recently when he ran out of epsom salt. He is interested in removing the right hallux medial border today.     REVIEW OF SYSTEMS:  Constitutional: Negative for fever and chills.  Skin: Negative for rash, callus or ulcer.  Neurologic: Feet were negative for changes in sensory or motor function.  Endocrine: Negative for heat or cold intolerance.  Hematologic: Patient on anticoagulants. []  YES    [x]  NO  Extremities:  Edema: right foot only.  Tenderness: bilaterally.  All other systems are reviewed and negative except as noted in history of present illness.    HISTORIES:  I have reviewed the past medical history, family history, social history, medications and allergies listed in the medical record as well as the nursing notes for this encounter.    OBJECTIVE  PHYSICAL EXAM:  Visit Vitals  Pulse 82   Ht 5' 6.5\" (1.689 m)   Wt 61.2 kg (135 lb)   BMI 21.46 kg/m²     General:  No apparent distress. Alert and oriented.  Neurological:  Protective sensation: Intact to light touch bilaterally.  Normal tone bilateral lower extremities.  Deep tendon reflexes: Achilles and patellar are 2/5 bilaterally.  Babinski: Negative bilaterally.  Clonus: Negative bilaterally.    Vascular:  Right Dorsalis Pedis 2/4 and Posterior Tibial 2/4.  Left Dorsalis Pedis 2/4 and Posterior Tibial 2/4  Capillary refill < three seconds bilaterally in digits 1-5  No edema of bilateral lower  Patient taking po well, denies pain. extremities/feet.  Hair growth present at the level of the digits    Dermatologic:  Skin: Clean, dry and intact bilaterally. Skin texture to the bilateral lower extremities is within normal limits. No ecchymosis or erythema to either foot or ankle.   Right hallux medial border is incurvated with pain, edema, and erythema surrounding the border.     Musculoskeletal:  Range of motion:  [x]  Normal    [] Abnormal  Muscle strength: Graded at 5/5 bilateral lower extremities, dorsiflexion, plantar flexion, inversion and eversion.  Gait analysis: [x]  Normal    [] Abnormal    ASSESSMENT:  1. Ingrown right greater toenail    2. Right foot pain        PLAN:  Orders Placed This Encounter   • neomycin-polymyxin-hydroCORTisone (CORTISPORIN) 3.5-18855-4 otic suspension       Patient was examined and evaluated.  Rx: cortisporin, advised on adverse effects  Ingrown Toenail Resection Procedure Note:  Indication: Ingrown toenail    Digit Affected: The right foot 1st digit.    Anesthesia: obtained by infiltration using 2% Lidocaine without epinephrine.    Procedure: The patient was appropriately positioned.  The digit was prepped with ChloraPrep. Local anesthesia was obtained by infiltration using 2% Lidocaine without epinephrine.  The right hallux medial border was removed and the nail matrix was chemically cauterized using three applications of 89% phenol for 30 seconds each.  Two applications of 70% isopropyl alcohol were administered in the exact same manner and the area was flushed with 70% isopropyl alcohol.  The wound was re-prepped and dressed with bacitracin and a sterile dressing.      The patient tolerated the procedure:  well, without difficulty.    Complications: None    Return in about 2 weeks (around 11/29/2022).    Instructions provided as documented in the after visit summary.    On 11/15/2022, Love FLORES, scribed the services personally performed by Del Phillips DPM.    The patient indicated  understanding of the diagnosis and agreed with the plan of care. The documentation recorded by the scribe accurately and completely reflects the service(s) I personally performed and the decisions made by me.

## (undated) DEVICE — COVER ARMBRD W13XL28.5IN IMPERV BLU FOR OP RM

## (undated) DEVICE — DUP USE 304928 DRESSING GZ 12 PLY 4X4 STRL

## (undated) DEVICE — BASIC SINGLE BASIN BTC-LF: Brand: MEDLINE INDUSTRIES, INC.

## (undated) DEVICE — GAUZE,SPONGE,8"X4",12PLY,XRAY,STRL,LF: Brand: MEDLINE

## (undated) DEVICE — HYPODERMIC SAFETY NEEDLE: Brand: MAGELLAN

## (undated) DEVICE — TRAY PREP DRY W/ PREM GLV 2 APPL 6 SPNG 2 UNDPD 1 OVERWRAP

## (undated) DEVICE — PACK PROCEDURE SURG SET UP SRMC

## (undated) DEVICE — SOLUTION IV 1000ML 0.9% SOD CHL PH 5 INJ USP VIAFLX PLAS

## (undated) DEVICE — GAUZE,SPONGE,4"X4",12PLY,STERILE,LF,2'S: Brand: MEDLINE

## (undated) DEVICE — ARM CRADLE: Brand: DEVON

## (undated) DEVICE — 4-PORT MANIFOLD: Brand: NEPTUNE 2

## (undated) DEVICE — DISCONTINUED USE 394504 SYRINGE LUER LOCK TIP 12 ML

## (undated) DEVICE — SYRINGE MED 10ML LUERLOCK TIP W/O SFTY DISP

## (undated) DEVICE — GOWN,SIRUS,NONRNF,SETINSLV,XL,20/CS: Brand: MEDLINE

## (undated) DEVICE — DISSECTOR ULTRASONIC L26CM CRDLSS W/ TORQ WRNCH SONICISION

## (undated) DEVICE — SHEET, T, LAPAROTOMY, STERILE: Brand: MEDLINE

## (undated) DEVICE — SUTURE VCRL SZ 3-0 L27IN ABSRB UD L26MM SH 1/2 CIR J416H